# Patient Record
Sex: MALE | NOT HISPANIC OR LATINO | Employment: OTHER | URBAN - METROPOLITAN AREA
[De-identification: names, ages, dates, MRNs, and addresses within clinical notes are randomized per-mention and may not be internally consistent; named-entity substitution may affect disease eponyms.]

---

## 2021-03-24 ENCOUNTER — TRANSFERRED RECORDS (OUTPATIENT)
Dept: HEALTH INFORMATION MANAGEMENT | Facility: CLINIC | Age: 66
End: 2021-03-24

## 2021-04-13 ENCOUNTER — OFFICE VISIT (OUTPATIENT)
Dept: OPHTHALMOLOGY | Facility: CLINIC | Age: 66
End: 2021-04-13
Attending: OPHTHALMOLOGY
Payer: MEDICARE

## 2021-04-13 DIAGNOSIS — H51.21 INTERNUCLEAR OPHTHALMOPLEGIA OF RIGHT EYE: ICD-10-CM

## 2021-04-13 DIAGNOSIS — H51.21 INTERNUCLEAR OPHTHALMOPLEGIA OF RIGHT EYE: Primary | ICD-10-CM

## 2021-04-13 DIAGNOSIS — H53.10 SUBJECTIVE VISUAL DISTURBANCE: Primary | ICD-10-CM

## 2021-04-13 LAB — MISCELLANEOUS TEST: NORMAL

## 2021-04-13 PROCEDURE — 92060 SENSORIMOTOR EXAMINATION: CPT | Mod: 26 | Performed by: OPHTHALMOLOGY

## 2021-04-13 PROCEDURE — 36415 COLL VENOUS BLD VENIPUNCTURE: CPT | Performed by: PATHOLOGY

## 2021-04-13 PROCEDURE — G0463 HOSPITAL OUTPT CLINIC VISIT: HCPCS | Mod: 25

## 2021-04-13 PROCEDURE — 99000 SPECIMEN HANDLING OFFICE-LAB: CPT | Performed by: PATHOLOGY

## 2021-04-13 PROCEDURE — 83519 RIA NONANTIBODY: CPT | Mod: 90 | Performed by: PATHOLOGY

## 2021-04-13 PROCEDURE — 99204 OFFICE O/P NEW MOD 45 MIN: CPT | Performed by: OPHTHALMOLOGY

## 2021-04-13 PROCEDURE — 92060 SENSORIMOTOR EXAMINATION: CPT | Performed by: OPHTHALMOLOGY

## 2021-04-13 RX ORDER — ATORVASTATIN CALCIUM 80 MG/1
80 TABLET, FILM COATED ORAL
COMMUNITY
Start: 2019-05-03

## 2021-04-13 RX ORDER — CLOPIDOGREL BISULFATE 75 MG/1
75 TABLET ORAL
COMMUNITY
Start: 2019-10-30

## 2021-04-13 RX ORDER — TAMSULOSIN HYDROCHLORIDE 0.4 MG/1
CAPSULE ORAL
COMMUNITY
Start: 2019-05-06

## 2021-04-13 RX ORDER — FLUTICASONE PROPIONATE 50 MCG
1 SPRAY, SUSPENSION (ML) NASAL
COMMUNITY
Start: 2019-05-03

## 2021-04-13 RX ORDER — NITROGLYCERIN 0.4 MG/1
TABLET SUBLINGUAL
COMMUNITY
Start: 2020-07-06

## 2021-04-13 RX ORDER — CYCLOBENZAPRINE HCL 5 MG
5 TABLET ORAL
COMMUNITY
Start: 2021-02-09

## 2021-04-13 RX ORDER — PANTOPRAZOLE SODIUM 40 MG/1
40 TABLET, DELAYED RELEASE ORAL
COMMUNITY
Start: 2019-10-30

## 2021-04-13 RX ORDER — LOSARTAN POTASSIUM 25 MG/1
50 TABLET ORAL
COMMUNITY
Start: 2021-02-09

## 2021-04-13 RX ORDER — IBUPROFEN 200 MG
200 TABLET ORAL
COMMUNITY
Start: 2021-03-03

## 2021-04-13 ASSESSMENT — EXTERNAL EXAM - RIGHT EYE: OD_EXAM: NORMAL

## 2021-04-13 ASSESSMENT — CONF VISUAL FIELD
METHOD: COUNTING FINGERS
OS_NORMAL: 1
OD_NORMAL: 1

## 2021-04-13 ASSESSMENT — TONOMETRY
OS_IOP_MMHG: 16
OD_IOP_MMHG: 16
IOP_METHOD: ICARE

## 2021-04-13 ASSESSMENT — EXTERNAL EXAM - LEFT EYE: OS_EXAM: NORMAL

## 2021-04-13 ASSESSMENT — CUP TO DISC RATIO
OD_RATIO: 0.35
OS_RATIO: 0.35

## 2021-04-13 ASSESSMENT — MARGIN REFLEX DISTANCE
OD_MRD1: 1
OS_MRD1: 1

## 2021-04-13 ASSESSMENT — VISUAL ACUITY
OD_SC: 20/40
OS_SC+: -1
METHOD: SNELLEN - LINEAR
OS_SC: 20/40

## 2021-04-13 ASSESSMENT — SLIT LAMP EXAM - LIDS
COMMENTS: NORMAL
COMMENTS: NORMAL

## 2021-04-13 NOTE — Clinical Note
4/13/2021       RE: Jose Johnson  [address] [facility]  [place] [facility] [address]     Dear Colleague,    Thank you for referring your patient, Jose Johnson, to the [facility] at [facility]. Please see a copy of my visit note below.         Assessment & Plan     Jose Johnson is a 65 year old male with the following diagnoses:   1. Internuclear ophthalmoplegia of right eye         Patient was sent for consultation by Dr. Carson Grubbs for internuclear ophthalmoplegia.      HPI:  About a month ago noted binocular double vision when driving. It is mostly when he looks to the LEFT. Sometimes he has double vision in primary gaze.  No ptosis.  Overall, it is intermittent.  He thinks it is gone in the morning when he wakes up.  It comes on later in the day.  Sometimes it is not present in left gaze.  He denies pain or headache. He has back problems and recently got shots in his back last week.  He has leg weakness since he was in [employer].  He gets muscle cramps with walking for more than 400 yards.  This has gotten worse with age.  His legs cramp with riding a bike too. He has trouble standing up after sitting on the ground. He has had presyncope on a few occasions.     Previous history of strokes.  Denies episodes of diplopia during the strokes.  Some dysphagia.        Independent historians:  Patient     Review of outside testing:  EXAM: MR HEAD BRAIN WO  LOCATION: [facility]  DATE/TIME: 2/23/2021 7:58 PM    INDICATION: Double Vision  COMPARISON: 04/29/2019  TECHNIQUE: Routine multiplanar multisequence head MRI without intravenous contrast.    FINDINGS:  INTRACRANIAL CONTENTS: No acute or subacute infarct. No mass, acute hemorrhage, or extra-axial fluid collections. Scattered nonspecific T2/FLAIR hyperintensities within the cerebral white matter most consistent with mild chronic microvascular ischemic change. Mild generalized  cerebral atrophy. No hydrocephalus. Normal position of the cerebellar tonsils.     SELLA: No abnormality accounting for technique.    OSSEOUS STRUCTURES/SOFT TISSUES: Normal marrow signal. The major intracranial vascular flow voids are maintained.     ORBITS: No abnormality accounting for technique.     SINUSES/MASTOIDS: Mucosal retention cyst left maxillary sinus. Opacification of inferior aspect of mastoid air cells bilaterally, left more than right.    IMPRESSION:  1.  No acute infarct, mass, mass effect, or hemorrhage.  2.  Mild chronic small vessel ischemia.  3.  Mild atrophy.    My interpretation performed today of outside testing:  I personally reviewed patient's MRI. There is no restricted diffusion in the brainstem on he right.  There are some white matter changes in the brainstem on the right but these look old.      Review of outside clinical notes:  Wandling    Past medical history:  Four heart attacks, stroke, prostate cancer treated with radiation      Family history / social history:  noncontributory    Exam:  Visual acuity 20/40 both eyes.  Orbicularis oculi weakness.  He has a RIGHT internuclear ophthalmoplegia.  He has 2+ nuclear sclerosis both eyes.  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  Negative ice test.      Tests ordered and interpreted today:  Sensorimotor    Discussion of management / interpretation with another provider: none    Assessment/Plan:     It is my impression that patient has right internuclear ophthalmoplegia.  This began about 2 months ago.  We discussed the most common cause in someone his age is microvascular.  It is also possible this is myasthenia as he he reports he is much better in the morning.  He also has history of leg weakness and dysphagia.  I will obtain MUSK, LRP4, and acetylcholine receptor binding antibody labs.  If these are normal, then follow up 6 weeks sooner as needed for worsening symptoms.                 Attending Physician  Attestation:  Complete documentation of historical and exam elements from today's encounter can be found in the full encounter summary report (not reduplicated in this progress note).  I personally obtained the chief complaint(s) and history of present illness.  I confirmed and edited as necessary the review of systems, past medical/surgical history, family history, social history, and examination findings as documented by others; and I examined the patient myself.  I personally reviewed the relevant tests, images, and reports as documented above.  I formulated and edited as necessary the assessment and plan and discussed the findings and management plan with the patient and family. - Ziyad Hernandez MD          Again, thank you for allowing me to participate in the care of your patient.      Sincerely,    Ziyad Hernandez MD

## 2021-04-13 NOTE — PROGRESS NOTES
Assessment & Plan     Jose Johnson is a 65 year old male with the following diagnoses:   1. Internuclear ophthalmoplegia of right eye         Patient was sent for consultation by Dr. Carson Grubbs for internuclear ophthalmoplegia.      HPI:  About a month ago noted binocular double vision when driving. It is mostly when he looks to the LEFT. Sometimes he has double vision in primary gaze.  No ptosis.  Overall, it is intermittent.  He thinks it is gone in the morning when he wakes up.  It comes on later in the day.  Sometimes it is not present in left gaze.  He denies pain or headache. He has back problems and recently got shots in his back last week.  He has leg weakness since he was in [employer].  He gets muscle cramps with walking for more than 400 yards.  This has gotten worse with age.  His legs cramp with riding a bike too. He has trouble standing up after sitting on the ground. He has had presyncope on a few occasions.     Previous history of strokes.  Denies episodes of diplopia during the strokes.  Some dysphagia.        Independent historians:  Patient     Review of outside testing:  EXAM: MR HEAD BRAIN WO  LOCATION: [facility]  DATE/TIME: 2/23/2021 7:58 PM    INDICATION: Double Vision  COMPARISON: 04/29/2019  TECHNIQUE: Routine multiplanar multisequence head MRI without intravenous contrast.    FINDINGS:  INTRACRANIAL CONTENTS: No acute or subacute infarct. No mass, acute hemorrhage, or extra-axial fluid collections. Scattered nonspecific T2/FLAIR hyperintensities within the cerebral white matter most consistent with mild chronic microvascular ischemic change. Mild generalized cerebral atrophy. No hydrocephalus. Normal position of the cerebellar tonsils.     SELLA: No abnormality accounting for technique.    OSSEOUS STRUCTURES/SOFT TISSUES: Normal marrow signal. The major intracranial vascular flow voids are maintained.     ORBITS: No abnormality accounting for  technique.     SINUSES/MASTOIDS: Mucosal retention cyst left maxillary sinus. Opacification of inferior aspect of mastoid air cells bilaterally, left more than right.    IMPRESSION:  1.  No acute infarct, mass, mass effect, or hemorrhage.  2.  Mild chronic small vessel ischemia.  3.  Mild atrophy.    My interpretation performed today of outside testing:  I personally reviewed patient's MRI. There is no restricted diffusion in the brainstem on he right.  There are some white matter changes in the brainstem on the right but these look old.      Review of outside clinical notes:  Wandling    Past medical history:  Four heart attacks, stroke, prostate cancer treated with radiation      Family history / social history:  noncontributory    Exam:  Visual acuity 20/40 both eyes.  Orbicularis oculi weakness.  He has a RIGHT internuclear ophthalmoplegia.  He has 2+ nuclear sclerosis both eyes.  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  Negative ice test.      Tests ordered and interpreted today:  Sensorimotor    Discussion of management / interpretation with another provider: none    Assessment/Plan:     It is my impression that patient has right internuclear ophthalmoplegia.  This began about 2 months ago.  We discussed the most common cause in someone his age is microvascular.  It is also possible this is myasthenia as he he reports he is much better in the morning.  He also has history of leg weakness and dysphagia.  I will obtain MUSK, LRP4, and acetylcholine receptor binding antibody labs.  If these are normal, then follow up 6 weeks sooner as needed for worsening symptoms.                 Attending Physician Attestation:  Complete documentation of historical and exam elements from today's encounter can be found in the full encounter summary report (not reduplicated in this progress note).  I personally obtained the chief complaint(s) and history of present illness.  I confirmed and edited as necessary  the review of systems, past medical/surgical history, family history, social history, and examination findings as documented by others; and I examined the patient myself.  I personally reviewed the relevant tests, images, and reports as documented above.  I formulated and edited as necessary the assessment and plan and discussed the findings and management plan with the patient and family. - Ziyad Hernandez MD

## 2021-04-13 NOTE — LETTER
2021         RE:  :  MRN: Jose Johnson  1955  [id number]     Dear Dr. Grubbs,    Thank you for asking me to see your very pleasant patient, Jose Johnson, in neuro-ophthalmic consultation.  I would like to thank you for sending your records and I have summarized them in the history of present illness.   My assessment and plan are below.  For further details, please see my attached clinic note.      Assessment & Plan     Jose Johnson is a 65 year old male with the following diagnoses:   1. Internuclear ophthalmoplegia of right eye         Patient was sent for consultation by Dr. Carson Grubbs for internuclear ophthalmoplegia.      HPI:  About a month ago noted binocular double vision when driving. It is mostly when he looks to the LEFT. Sometimes he has double vision in primary gaze.  No ptosis.  Overall, it is intermittent.  He thinks it is gone in the morning when he wakes up.  It comes on later in the day.  Sometimes it is not present in left gaze.  He denies pain or headache. He has back problems and recently got shots in his back last week.  He has leg weakness since he was in [employer].  He gets muscle cramps with walking for more than 400 yards.  This has gotten worse with age.  His legs cramp with riding a bike too. He has trouble standing up after sitting on the ground. He has had presyncope on a few occasions.     Previous history of strokes.  Denies episodes of diplopia during the strokes.  Some dysphagia.        Independent historians:  Patient     Review of outside testing:  EXAM: MR HEAD BRAIN WO  LOCATION: [facility]  DATE/TIME: 2021 7:58 PM    INDICATION: Double Vision  COMPARISON: 2019  TECHNIQUE: Routine multiplanar multisequence head MRI without intravenous contrast.    FINDINGS:  INTRACRANIAL CONTENTS: No acute or subacute infarct. No mass, acute hemorrhage, or extra-axial fluid collections. Scattered nonspecific T2/FLAIR  hyperintensities within the cerebral white matter most consistent with mild chronic microvascular ischemic change. Mild generalized cerebral atrophy. No hydrocephalus. Normal position of the cerebellar tonsils.     SELLA: No abnormality accounting for technique.    OSSEOUS STRUCTURES/SOFT TISSUES: Normal marrow signal. The major intracranial vascular flow voids are maintained.     ORBITS: No abnormality accounting for technique.     SINUSES/MASTOIDS: Mucosal retention cyst left maxillary sinus. Opacification of inferior aspect of mastoid air cells bilaterally, left more than right.    IMPRESSION:  1.  No acute infarct, mass, mass effect, or hemorrhage.  2.  Mild chronic small vessel ischemia.  3.  Mild atrophy.    My interpretation performed today of outside testing:  I personally reviewed patient's MRI. There is no restricted diffusion in the brainstem on he right.  There are some white matter changes in the brainstem on the right but these look old.      Review of outside clinical notes:  Wandling    Past medical history:  Four heart attacks, stroke, prostate cancer treated with radiation      Family history / social history:  noncontributory    Exam:  Visual acuity 20/40 both eyes.  Orbicularis oculi weakness.  He has a RIGHT internuclear ophthalmoplegia.  He has 2+ nuclear sclerosis both eyes.  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  Negative ice test.      Tests ordered and interpreted today:  Sensorimotor    Discussion of management / interpretation with another provider: none    Assessment/Plan:     It is my impression that patient has right internuclear ophthalmoplegia.  This began about 2 months ago.  We discussed the most common cause in someone his age is microvascular.  It is also possible this is myasthenia as he he reports he is much better in the morning.  He also has history of leg weakness and dysphagia.  I will obtain MUSK, LRP4, and acetylcholine receptor binding antibody labs.   If these are normal, then follow up 6 weeks sooner as needed for worsening symptoms.    Again, thank you for allowing me to participate in the care of your patient.      Sincerely,    Ziyad Hernandez MD  Professor  Ophthalmology Residency   Director of Neuro-Ophthalmology  [employer] [facility] [employer] Endowed Chair  Departments of Ophthalmology, Neurology, and Neurosurgery  [facility] [address] [place]  [address]  T - [phone]  F - [phone]  [email or web address]      CC: Carson Grubbs MD  [facility] [address] [place] [address]  Via Outside Provider Messaging     DX = internuclear ophthalmoplegia

## 2021-04-16 LAB — ACHR BIND AB SER-SCNC: 0.1 NMOL/L (ref 0–0.4)

## 2021-04-20 LAB — MUSCLE SPECIFIC KINASE (MUSK) ABY IGG: 0 NMOL/L (ref 0–0.03)

## 2021-04-21 ENCOUNTER — TELEPHONE (OUTPATIENT)
Dept: OPHTHALMOLOGY | Facility: CLINIC | Age: 66
End: 2021-04-21

## 2021-04-21 NOTE — TELEPHONE ENCOUNTER
Spoke to patient to let him know so far labs have come back unremarkable.  Will be in touch with further results.    Candis Reeves on 4/21/2021 at 3:33 PM

## 2021-05-03 LAB — LAB SCANNED RESULT: NORMAL

## 2021-05-07 ENCOUNTER — TELEPHONE (OUTPATIENT)
Dept: OPHTHALMOLOGY | Facility: CLINIC | Age: 66
End: 2021-05-07

## 2021-05-07 NOTE — CONFIDENTIAL NOTE
Left message for patient to call me to discuss results of labs.      All myasthenia labs unremarkable.  Patient should follow up as scheduled or sooner if worsening.     Candis Reeves on 5/7/2021 at 9:15 AM

## 2021-05-27 ENCOUNTER — OFFICE VISIT (OUTPATIENT)
Dept: OPHTHALMOLOGY | Facility: CLINIC | Age: 66
End: 2021-05-27
Attending: OPHTHALMOLOGY
Payer: MEDICARE

## 2021-05-27 DIAGNOSIS — H53.10 SUBJECTIVE VISUAL DISTURBANCE: Primary | ICD-10-CM

## 2021-05-27 DIAGNOSIS — H51.21 INO (INTERNUCLEAR OPHTHALMOPLEGIA), RIGHT: Primary | ICD-10-CM

## 2021-05-27 DIAGNOSIS — H53.2 DIPLOPIA: ICD-10-CM

## 2021-05-27 DIAGNOSIS — H53.10 SUBJECTIVE VISUAL DISTURBANCE: ICD-10-CM

## 2021-05-27 PROCEDURE — 92060 SENSORIMOTOR EXAMINATION: CPT | Performed by: OPHTHALMOLOGY

## 2021-05-27 PROCEDURE — 99213 OFFICE O/P EST LOW 20 MIN: CPT | Mod: GC | Performed by: OPHTHALMOLOGY

## 2021-05-27 PROCEDURE — G0463 HOSPITAL OUTPT CLINIC VISIT: HCPCS | Mod: 25

## 2021-05-27 ASSESSMENT — TONOMETRY
IOP_METHOD: ICARE
OD_IOP_MMHG: 19
OS_IOP_MMHG: 17

## 2021-05-27 ASSESSMENT — SLIT LAMP EXAM - LIDS
COMMENTS: NORMAL
COMMENTS: LOWER EYELID RETRACTION

## 2021-05-27 ASSESSMENT — REFRACTION_MANIFEST
OD_SPHERE: -3.50
OS_SPHERE: -3.00
OS_AXIS: 180
OD_AXIS: 180
OD_CYLINDER: +1.50
OS_CYLINDER: +1.00

## 2021-05-27 ASSESSMENT — EXTERNAL EXAM - RIGHT EYE: OD_EXAM: NORMAL

## 2021-05-27 ASSESSMENT — VISUAL ACUITY
OD_SC: 20/40
METHOD: SNELLEN - LINEAR
OS_SC: 20/40

## 2021-05-27 ASSESSMENT — CONF VISUAL FIELD
OD_NORMAL: 1
OS_NORMAL: 1

## 2021-05-27 ASSESSMENT — EXTERNAL EXAM - LEFT EYE: OS_EXAM: NORMAL

## 2021-05-27 NOTE — NURSING NOTE
Chief Complaints and History of Present Illnesses   Patient presents with     Diplopia Follow-Up     Chief Complaint(s) and History of Present Illness(es)     Diplopia Follow-Up               Comments     Jose Johnson is a 65 year old male with the following diagnoses:   1. Internuclear ophthalmoplegia of right eye   Patient notices significant improvement in diplopia when looking to the left.     Peggy AGUILAR 2:22 PM May 27, 2021

## 2021-05-27 NOTE — PROGRESS NOTES
Assessment & Plan     Jose Johnson is a 65 year old male with the following diagnoses:   1. KATELYN (internuclear ophthalmoplegia), right    2. Subjective visual disturbance         65 year old man presented for follow up on right internuclear ophthalmoplegia. He reports that diplopia has improved, it is more prominent when he looks to the left. He denies any eyelid droopiness. No recent head trauma     Past medical history:  Four heart attacks, stroke, prostate cancer treated with radiation    AcetChol Binding Blank 0.1  Muscle Specific Kinase (MuSK) Blank IgG  0.00   LRP4    MR HEAD BRAIN WO 2/23/2021   IMPRESSION:  1.  No acute infarct, mass, mass effect, or hemorrhage.  2.  Mild chronic small vessel ischemia.  3.  Mild atrophy    It is my impression that patient has right internuclear ophthalmoplegia that has improved. His MRI was unremarkable. 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  Follow up in 2 months sooner as needed for worsening symptoms.            Attending Physician Attestation:  Complete documentation of historical and exam elements from today's encounter can be found in the full encounter summary report (not reduplicated in this progress note).  I personally obtained the chief complaint(s) and history of present illness.  I confirmed and edited as necessary the review of systems, past medical/surgical history, family history, social history, and examination findings as documented by others; and I examined the patient myself.  I personally reviewed the relevant tests, images, and reports as documented above.  I formulated and edited as necessary the assessment and plan and discussed the findings and management plan with the patient and family. I personally reviewed the ophthalmic test(s) associated with this encounter, agree with the interpretation(s) as documented by the resident/fellow, and have edited the corresponding report(s) as necessary.  - Ziyad Fuller  MD Lanre  Neuro-ophthalmology fellow  [facility]